# Patient Record
Sex: MALE | URBAN - METROPOLITAN AREA
[De-identification: names, ages, dates, MRNs, and addresses within clinical notes are randomized per-mention and may not be internally consistent; named-entity substitution may affect disease eponyms.]

---

## 2023-12-07 ENCOUNTER — TELEPHONE (OUTPATIENT)
Age: 78
End: 2023-12-07

## 2023-12-07 NOTE — TELEPHONE ENCOUNTER
Therese requesting a call back to schedule new patient (virtual) appointment with Dr. Oden per Dr. Barlow to evaluate for AVR and cath. Please send call to Vamsi

## 2023-12-08 ENCOUNTER — TELEPHONE (OUTPATIENT)
Age: 78
End: 2023-12-08

## 2023-12-08 NOTE — TELEPHONE ENCOUNTER
Therese requesting a call back to schedule a new patient appointment with Dr. Oden. Please send call to me. Thanks.

## 2023-12-12 ENCOUNTER — TELEPHONE (OUTPATIENT)
Age: 78
End: 2023-12-12

## 2023-12-12 NOTE — TELEPHONE ENCOUNTER
Spoke with patient regarding a virtual visit with Dr. Oden for evaluation. Patient states that he does not wish to schedule anything at this time as it would be a 4 hour commute for him one way. Offered the Virtual visit and he stated that it would be pointless as in order to move forward he would potentially have to make the drive in which he is not willing to do at this time. Patient advised that if for any reason he would like to schedule feel free to reach out.